# Patient Record
Sex: MALE | Race: WHITE | NOT HISPANIC OR LATINO | Employment: UNEMPLOYED | URBAN - METROPOLITAN AREA
[De-identification: names, ages, dates, MRNs, and addresses within clinical notes are randomized per-mention and may not be internally consistent; named-entity substitution may affect disease eponyms.]

---

## 2018-03-15 ENCOUNTER — HOSPITAL ENCOUNTER (EMERGENCY)
Facility: MEDICAL CENTER | Age: 33
End: 2018-03-15

## 2018-03-15 VITALS
RESPIRATION RATE: 16 BRPM | HEART RATE: 92 BPM | HEIGHT: 72 IN | WEIGHT: 166.01 LBS | DIASTOLIC BLOOD PRESSURE: 83 MMHG | SYSTOLIC BLOOD PRESSURE: 124 MMHG | OXYGEN SATURATION: 95 % | TEMPERATURE: 97.6 F | BODY MASS INDEX: 22.48 KG/M2

## 2018-03-15 PROCEDURE — 302449 STATCHG TRIAGE ONLY (STATISTIC)

## 2018-03-15 ASSESSMENT — PAIN SCALES - GENERAL: PAINLEVEL_OUTOF10: 6

## 2018-03-15 NOTE — ED TRIAGE NOTES
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

## 2018-04-16 ENCOUNTER — HOSPITAL ENCOUNTER (EMERGENCY)
Facility: MEDICAL CENTER | Age: 33
End: 2018-04-16

## 2021-04-17 ENCOUNTER — HOSPITAL ENCOUNTER (EMERGENCY)
Facility: MEDICAL CENTER | Age: 36
End: 2021-04-18
Attending: EMERGENCY MEDICINE
Payer: MEDICAID

## 2021-04-17 VITALS
HEIGHT: 72 IN | WEIGHT: 150 LBS | BODY MASS INDEX: 20.32 KG/M2 | SYSTOLIC BLOOD PRESSURE: 120 MMHG | OXYGEN SATURATION: 91 % | DIASTOLIC BLOOD PRESSURE: 79 MMHG | HEART RATE: 89 BPM | RESPIRATION RATE: 15 BRPM | TEMPERATURE: 98.4 F

## 2021-04-17 DIAGNOSIS — F29 PSYCHOSIS, UNSPECIFIED PSYCHOSIS TYPE (HCC): ICD-10-CM

## 2021-04-17 LAB
AMPHET UR QL SCN: NEGATIVE
BARBITURATES UR QL SCN: NEGATIVE
BENZODIAZ UR QL SCN: NEGATIVE
BZE UR QL SCN: NEGATIVE
CANNABINOIDS UR QL SCN: NEGATIVE
METHADONE UR QL SCN: NEGATIVE
OPIATES UR QL SCN: NEGATIVE
OXYCODONE UR QL SCN: NEGATIVE
PCP UR QL SCN: NEGATIVE
POC BREATHALIZER: 0 PERCENT (ref 0–0.01)
PROPOXYPH UR QL SCN: NEGATIVE

## 2021-04-17 PROCEDURE — 700102 HCHG RX REV CODE 250 W/ 637 OVERRIDE(OP): Performed by: EMERGENCY MEDICINE

## 2021-04-17 PROCEDURE — 700111 HCHG RX REV CODE 636 W/ 250 OVERRIDE (IP): Performed by: EMERGENCY MEDICINE

## 2021-04-17 PROCEDURE — A9270 NON-COVERED ITEM OR SERVICE: HCPCS | Performed by: EMERGENCY MEDICINE

## 2021-04-17 PROCEDURE — 96372 THER/PROPH/DIAG INJ SC/IM: CPT

## 2021-04-17 PROCEDURE — 302970 POC BREATHALIZER: Performed by: EMERGENCY MEDICINE

## 2021-04-17 PROCEDURE — 99285 EMERGENCY DEPT VISIT HI MDM: CPT

## 2021-04-17 PROCEDURE — 80307 DRUG TEST PRSMV CHEM ANLYZR: CPT

## 2021-04-17 RX ORDER — DIPHENHYDRAMINE HYDROCHLORIDE 50 MG/ML
50 INJECTION INTRAMUSCULAR; INTRAVENOUS ONCE
Status: COMPLETED | OUTPATIENT
Start: 2021-04-17 | End: 2021-04-17

## 2021-04-17 RX ORDER — LORAZEPAM 2 MG/1
2 TABLET ORAL ONCE
Status: COMPLETED | OUTPATIENT
Start: 2021-04-17 | End: 2021-04-17

## 2021-04-17 RX ORDER — HALOPERIDOL 5 MG/ML
5 INJECTION INTRAMUSCULAR ONCE
Status: DISCONTINUED | OUTPATIENT
Start: 2021-04-17 | End: 2021-04-17

## 2021-04-17 RX ORDER — HALOPERIDOL 5 MG/ML
5 INJECTION INTRAMUSCULAR ONCE
Status: COMPLETED | OUTPATIENT
Start: 2021-04-17 | End: 2021-04-17

## 2021-04-17 RX ADMIN — HALOPERIDOL LACTATE 5 MG: 5 INJECTION, SOLUTION INTRAMUSCULAR at 10:00

## 2021-04-17 RX ADMIN — LORAZEPAM 2 MG: 2 TABLET ORAL at 08:15

## 2021-04-17 RX ADMIN — DIPHENHYDRAMINE HYDROCHLORIDE 50 MG: 50 INJECTION, SOLUTION INTRAMUSCULAR; INTRAVENOUS at 10:00

## 2021-04-17 ASSESSMENT — LIFESTYLE VARIABLES: REASON UNABLE TO ASSESS: UTA

## 2021-04-17 NOTE — ED PROVIDER NOTES
"ED Provider Note    CHIEF COMPLAINT  Chief Complaint   Patient presents with    Psych Eval       HPI  Tony Cotton is a 35 y.o. male here for evaluation of psychosis.  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  He then states that he is \"SERENE Tobin\" and that he is the \"richest fucking man alive.\"  Patient endorses methamphetamine use and possibly other drugs, but he is unable to give an exact time when his last use was.  He has no chest pain or shortness of breath, he has no fever chills, he has no vomiting.    ROS;  Please see HPI  O/W negative     PAST MEDICAL HISTORY   No known medical bleeding disorders    SOCIAL HISTORY  Social History     Tobacco Use    Smoking status: Current Some Day Smoker   Substance and Sexual Activity    Alcohol use: Yes    Drug use: Yes    Sexual activity: Not on file       SURGICAL HISTORY  patient denies any surgical history    CURRENT MEDICATIONS  Home Medications       Reviewed by Catherine Segundo R.N. (Registered Nurse) on 04/17/21 at 0742  Med List Status: Unable to Obtain     Medication Last Dose Status        Patient Charly Taking any Medications                           ALLERGIES  No Known Allergies    REVIEW OF SYSTEMS  See HPI for further details. Review of systems as above, otherwise all other systems are negative.     PHYSICAL EXAM  VITAL SIGNS: BP (!) 170/100   Pulse (!) 107   Temp 36.9 °C (98.4 °F) (Temporal)   Resp 18   Ht 1.829 m (6')   Wt 68 kg (150 lb)   SpO2 96%   BMI 20.34 kg/m²     Constitutional: Well developed, well nourished.  Moderate acute distress.  HEENT: Normocephalic, atraumatic. MMM  Neck: Supple, Full range of motion   Chest/Pulmonary:  No respiratory distress.  Equal expansion   Musculoskeletal: No deformity, no edema, neurovascular intact.   Neuro: Tangential speech, inappropriate, uncooperative cranial nerves II-XII grossly intact.  Psych: Agitated mood and " affect    Results for orders placed or performed during the hospital encounter of 04/17/21   URINE DRUG SCREEN   Result Value Ref Range    Amphetamines Urine Negative Negative    Barbiturates Negative Negative    Benzodiazepines Negative Negative    Cocaine Metabolite Negative Negative    Methadone Negative Negative    Opiates Negative Negative    Oxycodone Negative Negative    Phencyclidine -Pcp Negative Negative    Propoxyphene Negative Negative    Cannabinoid Metab Negative Negative   POC BREATHALIZER   Result Value Ref Range    POC Breathalizer 0 0.00 - 0.01 Percent           PROCEDURES     MEDICAL RECORD  I have reviewed patient's medical record and pertinent results are listed.    COURSE & MEDICAL DECISION MAKING  I have reviewed any medical record information, laboratory studies and radiographic results as noted above.    8:05 AM  At this time a legal hold was filled out for the patient.  He is yelling statements that he is richest man on earth, and then he is masturbating in the room when people walk in.  Patient will be seen and evaluated by psych and likely transferred.      FINAL IMPRESSION  1. Psychosis, unspecified psychosis type (HCC)             Electronically signed by: Georges Andrew D.O., 4/17/2021 8:04 AM

## 2021-04-17 NOTE — ED NOTES
Called security to remove restraints.  Pt calm, verbalized understanding that he may not flash his genitals or masturbate at staff, may not be aggressive toward staff.  Agrees, verbalized understanding that if he does resume aggressive behaviors, restraints may be restarted.

## 2021-04-17 NOTE — ED TRIAGE NOTES
"Tony Cotton 35 y.o. male bib EMS for     Chief Complaint   Patient presents with   • Psych Eval     EMS called to [employer], patient's employer, because he reportedly was yelling at people in the store to leave without paying and then started just yelling.  Pt had pressured speech made up mostly of numbers.  Pt's name confirmed but difficult to obtain.  Pt denies to EMS drug use but endorsed hallucinations, both auditory and visual.  Pt tells RN, \"Yeah and it feels so good\" when asked about meth use.  Pt states repeatedly, \"I am the richest person alive, yo and it feels so damn good\".  Pt clapping randomly.  Pt denies pain or any medical complaint.  Denies SI/HI.  BP (!) 170/100   Pulse (!) 107   Temp 36.9 °C (98.4 °F) (Temporal)   Resp 18   Ht 1.829 m (6')   Wt 68 kg (150 lb)   SpO2 96%   BMI 20.34 kg/m²     "

## 2021-04-17 NOTE — ED NOTES
Knife taken with security; PARs took money, ID and Social Security to safe keeping. Patients belongings placed in locker #2 (2 bags )

## 2021-04-17 NOTE — ED NOTES
Sitter 1;1 in direct ;line of sight, pt remains in restraints and is still on L2k. Vitals to follow.

## 2021-04-17 NOTE — ED NOTES
ERP bedside.  Pt became suddenly agitated, pulled off all medical equipment and began masturbating.  Pt unable to be redirected.  Pt being placed on Legal 2000 by ERP and pt transporting to [facility].

## 2021-04-17 NOTE — ED NOTES
Sitter 1:1 at bedside, pt resting on side. Provided patient with a blanket. No new orders, urinal at bedside

## 2021-04-17 NOTE — ED NOTES
Unable to obtain med rec at this time. Pt is unable to participate in interview at this time. No pharmacy of emergency contacts listed.

## 2021-04-17 NOTE — ED NOTES
Patient attempted to elope, erp and [facility] security notified. Restraints ordered per ERP as patient on L2K.

## 2021-04-17 NOTE — ED NOTES
Pt to [facility] w/ security standing by.  Pt stopped masturbating as requested by security for transport through ED.  Bedside report to Rich ABREU.

## 2021-04-17 NOTE — ED NOTES
"Male sitter 1:1 at bedside; as reported to this RN that the patient can be extremely inappropriate. Flashing himself to people in the room; repeating \"man I feel so fucking good.\"     Patient wsa given 2mg PO ativan; pt took pill without water as he poured water down his face and repeated, \"I'm the richest man in the world, I work at all the [employer]; and I feel so amazing.\"    Bed low and locked, urinal at bedside for UA/UDS   "

## 2021-04-18 PROCEDURE — 90791 PSYCH DIAGNOSTIC EVALUATION: CPT

## 2021-04-18 NOTE — ED NOTES
"Report to Sree ABREU. All questions answered. Pt just awoke and states \"when can I go home?\" Pt educated on legal hold process. Alert team will be notified pt is awake. VS reassessed and stable.  "

## 2021-04-18 NOTE — ED NOTES
Assumed care of patient. Pt assessed, and sleeping in bed . Patient's concerns addressed.  Fall precautions in place.  Pt repositioned and comfortable.  Charge notified of need for sitter. VS reassessed. Will continue to monitor. This RN masked and in appropriate PPE during encounter.

## 2021-04-18 NOTE — ED NOTES
Patient resting in bed. No acute distress.    No complaints at this time.    Fall and safety precautions maintained.    Hourly rounding in progress.    1 to 1 sitter outside door,

## 2021-04-18 NOTE — ED NOTES
Patient discharged in stable condition per orders. Wristband removed per protocol. Patient verbalized understanding of all discharge instructions. All belongings accounted for.

## 2021-04-18 NOTE — ED PROVIDER NOTES
ED Provider Note    Addendum:    Patient placed on legal hold yesterday. Behavioral health services evaluated the patient tonight. Patient seems to have cleared from his psychosis. Patient now very polite and respectful. No SI HI. Appears to be able to care for self. Will discharge from the ER this point.    I have decertified the legal hold given his current clinical presentation.    Labs: UDS negative   alcohol negative.      Impression:    Transient psychosis likely secondary to ingestion of unknown substance.

## 2021-04-18 NOTE — ED NOTES
Pt rounded on, asleep in bed, respirations even and unlabored, repositioning self as needed, updated on POC, sitter in view.

## 2021-04-18 NOTE — ED NOTES
Pt rounded on asleep in bed, in bed, respirations even and unlabored, repositioning self as needed and pt is now lying prone, sitter in view.

## 2021-04-18 NOTE — ED NOTES
Pt rounded on, asleep in bed, respirations even and unlabored, repositioning self as needed, sitter Zoila in view, report given to Zoila.

## 2021-04-18 NOTE — CONSULTS
"[facility]   TRIAGE ASSESSMENT    Name: Tony Cotton  MRN: [id number]  : 1985  Age: 35 y.o.  Date of assessment: 2021  PCP: Pcp Pt States None  Persons in attendance: Patient    CHIEF COMPLAINT/PRESENTING ISSUE (as stated by patient): Pt is a 34 y/o male BIB EMS after they were called to [employer], the pt's employer, because he reportedly was yelling at people in the store to leave without paying and then started just yelling.  Pt had pressured speech made up mostly of numbers.  Pt's name confirmed but difficult to obtain.  Pt denies to EMS drug use but endorsed hallucinations, both auditory and visual.  Pt tells RN, \"Yeah and it feels so good\" when asked about meth use.  Pt states repeatedly, \"I am the richest person alive, yo and it feels so damn good\".  Pt clapping randomly. When assessed by the ERP, pt became agitated and pulled all of his medical equipment off and began masturbating. Pt was placed on a legal hold at this time. Pt was given 2mg PO ativan without water because he poured the water down his face and repeated, \"I'm the richest man in the world, I work at all the [employer], and I feel so amazing.\" Urinated on the floor. Attempted to elope and was placed in restraints. Restraints removed at 1255 on 2021. Pt then slept for the rest of the afternoon into the evening.     Pt assessed by this writer for behavioral health consult. Pt appears way more clear than he was earlier after getting some rest. When pt was asked what happened he states, \"I just lost it earlier. I haven't been sleeping good and I've been working a lot.\" Pt logical and making sense. Denies SI/HI/AVH. Pt asking to go home. Pt states he does not take any medication. Currently has a therapist through [facility]. Hx of inpatient psychiatric hospitalization 2 years ago. Denies ETOH use; GIOVANNY 0.00. States he has not used meth in two years; UDS negative. ERP re-evaluated pt and agrees pt is safe to discharge to self and " return home at this time. Pt states he lives alone in an apartment. Legal hold de-certified.   Chief Complaint   Patient presents with   • Psych Eval        CURRENT LIVING SITUATION/SOCIAL SUPPORT: Lives alone in an apartment. Works full time at [employer]. Reports brother as social support system.     BEHAVIORAL HEALTH TREATMENT HISTORY  Does patient/parent report a history of prior behavioral health treatment for patient?   Yes:    Dates Level of Care Facilty/Provider Diagnosis/Problem Medications   Current [facility] for therapy                                                                          SAFETY ASSESSMENT - SELF  Does patient acknowledge current or past symptoms of dangerousness to self? no  Does parent/significant other report patient has current or past symptoms of dangerousness to self? N\A  Does presenting problem suggest symptoms of dangerousness to self? No; denies SI.     SAFETY ASSESSMENT - OTHERS  Does patient acknowledge current or past symptoms of aggressive behavior or risk to others? no  Does parent/significant other report patient has current or past symptoms of aggressive behavior or risk to others?  N\A  Does presenting problem suggest symptoms of dangerousness to others? No; denies HI/aggressive hx.    Crisis Safety Plan completed and copy given to patient? N/A     ABUSE/NEGLECT SCREENING  Does patient report feeling “unsafe” in his/her home, or afraid of anyone?  no  Does patient report any history of physical, sexual, or emotional abuse?  no  Does parent or significant other report any of the above? N\A  Is there evidence of neglect by self?  no  Is there evidence of neglect by a caregiver? no  Does the patient/parent report any history of CPS/APS/police involvement related to suspected abuse/neglect or domestic violence? no  Based on the information provided during the current assessment, is a mandated report of suspected abuse/neglect being made?  No    SUBSTANCE USE  "SCREENING  Yes:  William all substances used in the past 30 days:      Last Use Amount   []   Alcohol     []   Marijuana     []   Heroin     []   Prescription Opioids  (used without prescription, for    recreation, or in excess of prescribed amount)     []   Other Prescription  (used without prescription, for    recreation, or in excess of prescribed amount)     []   Cocaine      [x]   Methamphetamine 2 years ago Did not specify   []   \"\" drugs (ectasy, MDMA)     []   Other substances        UDS results: negative  Breathalyzer results: 0.00      MENTAL STATUS   Participation: Active verbal participation  Grooming: Casual  Orientation: Fully Oriented and Drowsy/Somnolent  Behavior: Calm  Eye contact: Poor  Mood: Euthymic  Affect: Blunted  Thought process: Logical and Circumstantial  Thought content: Within normal limits  Speech: Soft  Perception: Within normal limits  Memory:  No gross evidence of memory deficits  Insight: Adequate  Judgment:  Adequate  Other:    Collateral information:   Source:  [] Significant other present in person:   [] Significant other by telephone  [] [facility]   [x] [facility] Nursing Staff  [x] [facility]  [x] Other: ERP    [] Unable to complete full assessment due to:  [] Acute intoxication  [] Patient declined to participate/engage  [] Patient verbally unresponsive  [] Significant cognitive deficits  [] Significant perceptual distortions or behavioral disorganization  [x] Other: N/A     CLINICAL IMPRESSIONS:  Primary:  Substance use  Secondary:  Psychosis (cleared up once assessed for behavioral health consult)       IDENTIFIED NEEDS/PLAN:  [Trigger DISPOSITION list for any items marked]    []  Imminent safety risk - self [] Imminent safety risk - others   []  Acute substance withdrawal []  Psychosis/Impaired reality testing   []  Mood/anxiety [x]  Substance use/Addictive behavior   [x]  Maladaptive behavior []  Parent/child conflict   []  Family/Couples conflict []  " Biomedical   []  Housing []  Financial   []   Legal  Occupational/Educational   []  Domestic violence []  Other:     Recommended Plan of Care:  Actively being addressed by [facility]. Medicaid FFS insurance plan. Denies SI/HI/AVH. Pt appears to have cleared up and not showing signs of psychosis. Findings discussed with ERP who agrees pt is safe to discharge to self and return home.     Does patient express agreement with the above plan? yes    Referral appointment(s) scheduled? N\A    Alert team only:   I have discussed findings and recommendations with Dr. Piedra who is in agreement with these recommendations.     Referral information sent to the following community providers : N/A      Cynthia Paulino R.N.  4/18/2021

## 2021-04-23 ENCOUNTER — HOSPITAL ENCOUNTER (EMERGENCY)
Facility: MEDICAL CENTER | Age: 36
End: 2021-04-23
Attending: EMERGENCY MEDICINE
Payer: MEDICAID

## 2021-04-23 VITALS
DIASTOLIC BLOOD PRESSURE: 96 MMHG | BODY MASS INDEX: 25.77 KG/M2 | HEIGHT: 70 IN | RESPIRATION RATE: 19 BRPM | HEART RATE: 88 BPM | OXYGEN SATURATION: 98 % | TEMPERATURE: 97.8 F | SYSTOLIC BLOOD PRESSURE: 146 MMHG | WEIGHT: 180 LBS

## 2021-04-23 DIAGNOSIS — F10.929 ALCOHOLIC INTOXICATION WITH COMPLICATION (HCC): ICD-10-CM

## 2021-04-23 LAB
ALBUMIN SERPL BCP-MCNC: 3.8 G/DL (ref 3.2–4.9)
ALBUMIN/GLOB SERPL: 1.3 G/DL
ALP SERPL-CCNC: 97 U/L (ref 30–99)
ALT SERPL-CCNC: 64 U/L (ref 2–50)
ANION GAP SERPL CALC-SCNC: 14 MMOL/L (ref 7–16)
APAP SERPL-MCNC: <5 UG/ML (ref 10–30)
AST SERPL-CCNC: 94 U/L (ref 12–45)
BASOPHILS # BLD AUTO: 0.5 % (ref 0–1.8)
BASOPHILS # BLD: 0.03 K/UL (ref 0–0.12)
BILIRUB SERPL-MCNC: 0.2 MG/DL (ref 0.1–1.5)
BUN SERPL-MCNC: 14 MG/DL (ref 8–22)
CALCIUM SERPL-MCNC: 7.8 MG/DL (ref 8.5–10.5)
CHLORIDE SERPL-SCNC: 103 MMOL/L (ref 96–112)
CO2 SERPL-SCNC: 19 MMOL/L (ref 20–33)
CREAT SERPL-MCNC: 0.62 MG/DL (ref 0.5–1.4)
EOSINOPHIL # BLD AUTO: 0.2 K/UL (ref 0–0.51)
EOSINOPHIL NFR BLD: 3.2 % (ref 0–6.9)
ERYTHROCYTE [DISTWIDTH] IN BLOOD BY AUTOMATED COUNT: 41.5 FL (ref 35.9–50)
ETHANOL BLD-MCNC: 282 MG/DL (ref 0–10)
GLOBULIN SER CALC-MCNC: 2.9 G/DL (ref 1.9–3.5)
GLUCOSE SERPL-MCNC: 121 MG/DL (ref 65–99)
HCT VFR BLD AUTO: 37.6 % (ref 42–52)
HGB BLD-MCNC: 12.4 G/DL (ref 14–18)
IMM GRANULOCYTES # BLD AUTO: 0.03 K/UL (ref 0–0.11)
IMM GRANULOCYTES NFR BLD AUTO: 0.5 % (ref 0–0.9)
LYMPHOCYTES # BLD AUTO: 1.04 K/UL (ref 1–4.8)
LYMPHOCYTES NFR BLD: 16.9 % (ref 22–41)
MCH RBC QN AUTO: 29.2 PG (ref 27–33)
MCHC RBC AUTO-ENTMCNC: 33 G/DL (ref 33.7–35.3)
MCV RBC AUTO: 88.5 FL (ref 81.4–97.8)
MONOCYTES # BLD AUTO: 0.42 K/UL (ref 0–0.85)
MONOCYTES NFR BLD AUTO: 6.8 % (ref 0–13.4)
NEUTROPHILS # BLD AUTO: 4.44 K/UL (ref 1.82–7.42)
NEUTROPHILS NFR BLD: 72.1 % (ref 44–72)
NRBC # BLD AUTO: 0 K/UL
NRBC BLD-RTO: 0 /100 WBC
PLATELET # BLD AUTO: 298 K/UL (ref 164–446)
PMV BLD AUTO: 9.5 FL (ref 9–12.9)
POTASSIUM SERPL-SCNC: 3.1 MMOL/L (ref 3.6–5.5)
PROT SERPL-MCNC: 6.7 G/DL (ref 6–8.2)
RBC # BLD AUTO: 4.25 M/UL (ref 4.7–6.1)
SALICYLATES SERPL-MCNC: <1 MG/DL (ref 15–25)
SODIUM SERPL-SCNC: 136 MMOL/L (ref 135–145)
WBC # BLD AUTO: 6.2 K/UL (ref 4.8–10.8)

## 2021-04-23 PROCEDURE — 85025 COMPLETE CBC W/AUTO DIFF WBC: CPT

## 2021-04-23 PROCEDURE — 80179 DRUG ASSAY SALICYLATE: CPT

## 2021-04-23 PROCEDURE — 99284 EMERGENCY DEPT VISIT MOD MDM: CPT

## 2021-04-23 PROCEDURE — 80053 COMPREHEN METABOLIC PANEL: CPT

## 2021-04-23 PROCEDURE — 82077 ASSAY SPEC XCP UR&BREATH IA: CPT

## 2021-04-23 PROCEDURE — 36415 COLL VENOUS BLD VENIPUNCTURE: CPT

## 2021-04-23 PROCEDURE — 80143 DRUG ASSAY ACETAMINOPHEN: CPT

## 2021-04-23 NOTE — ED TRIAGE NOTES
"Chief Complaint   Patient presents with   • Alcohol Intoxication     Pt's friend states he 'chugged' 1/5 of liquor approx 07:00 this AM, did not state why, pt arrives [facility] responds to noxious stimulus and states his name, pt vomited at triage, no signs of aspiration, given 4mg of zofran en route by EMS.      Pt BIB EMS for above complaint, VSS on RA, BG 91, zofran and 300mL fluids given en route.     /69   Pulse 83   Temp 36.6 °C (97.8 °F) (Temporal)   Ht 1.778 m (5' 10\")   Wt 81.6 kg (180 lb)   SpO2 92%   BMI 25.83 kg/m²      "

## 2021-04-23 NOTE — ED PROVIDER NOTES
ED Provider Note    CHIEF COMPLAINT  No chief complaint on file.      HPI  Tony Cotton is a 35 y.o. male with recent hx of psychiatric illness, evaluated for psychosis aproximately 1week ago, pt psychosis cleared, pt was lucid and sxs resolved so patient was discharged home. His UDS at the time was unremarkable.  Patient today apparently drank 1/5 of vodka in less than 5 minutes in front of his roommate.  There was no obvious coingestion.  Patient became acutely intoxicated and EMS was called when patient became increasingly more intoxicated.  Patient apparently drinks the alcohol 7 AM this morning.  Per EMS there were no other pill bottles.  Per EMS there was no communication with the roommate that patient wanted to harm himself.  History is obviously significantly limited as patient is severely intoxicated.      REVIEW OF SYSTEMS  ROS    See HPI for further details. All other systems are negative.     PAST MEDICAL HISTORY       SOCIAL HISTORY  Social History     Tobacco Use   • Smoking status: Current Some Day Smoker   Substance and Sexual Activity   • Alcohol use: Yes   • Drug use: Yes   • Sexual activity: Not on file       SURGICAL HISTORY  patient denies any surgical history    CURRENT MEDICATIONS  Home Medications    **Home medications have not yet been reviewed for this encounter**         ALLERGIES  No Known Allergies    PHYSICAL EXAM  There were no vitals filed for this visit.    Physical Exam   Constitutional: He appears well-developed and well-nourished.   Smells of alcohol   HENT:   Head: Normocephalic and atraumatic.   Eyes: Pupils are equal, round, and reactive to light. Conjunctivae are normal.   Cardiovascular: Normal rate and regular rhythm. Exam reveals no gallop and no friction rub.   No murmur heard.  Pulmonary/Chest: Effort normal and breath sounds normal. No respiratory distress. He has no wheezes.   Abdominal: Soft. Bowel sounds are normal. He exhibits no distension. There is no abdominal  tenderness. There is no rebound.   Musculoskeletal:      Cervical back: Normal range of motion and neck supple.   Neurological:   Gag reflex intact   Skin: Skin is warm and dry.   Psychiatric:   Severely intoxicated, opens eyes, and localizes to sternal rub moving all extremities     DIAGNOSTIC STUDIES / PROCEDURES      LABS  Results for orders placed or performed during the hospital encounter of 04/23/21   CBC WITH DIFFERENTIAL   Result Value Ref Range    WBC 6.2 4.8 - 10.8 K/uL    RBC 4.25 (L) 4.70 - 6.10 M/uL    Hemoglobin 12.4 (L) 14.0 - 18.0 g/dL    Hematocrit 37.6 (L) 42.0 - 52.0 %    MCV 88.5 81.4 - 97.8 fL    MCH 29.2 27.0 - 33.0 pg    MCHC 33.0 (L) 33.7 - 35.3 g/dL    RDW 41.5 35.9 - 50.0 fL    Platelet Count 298 164 - 446 K/uL    MPV 9.5 9.0 - 12.9 fL    Neutrophils-Polys 72.10 (H) 44.00 - 72.00 %    Lymphocytes 16.90 (L) 22.00 - 41.00 %    Monocytes 6.80 0.00 - 13.40 %    Eosinophils 3.20 0.00 - 6.90 %    Basophils 0.50 0.00 - 1.80 %    Immature Granulocytes 0.50 0.00 - 0.90 %    Nucleated RBC 0.00 /100 WBC    Neutrophils (Absolute) 4.44 1.82 - 7.42 K/uL    Lymphs (Absolute) 1.04 1.00 - 4.80 K/uL    Monos (Absolute) 0.42 0.00 - 0.85 K/uL    Eos (Absolute) 0.20 0.00 - 0.51 K/uL    Baso (Absolute) 0.03 0.00 - 0.12 K/uL    Immature Granulocytes (abs) 0.03 0.00 - 0.11 K/uL    NRBC (Absolute) 0.00 K/uL   CMP   Result Value Ref Range    Sodium 136 135 - 145 mmol/L    Potassium 3.1 (L) 3.6 - 5.5 mmol/L    Chloride 103 96 - 112 mmol/L    Co2 19 (L) 20 - 33 mmol/L    Anion Gap 14.0 7.0 - 16.0    Glucose 121 (H) 65 - 99 mg/dL    Bun 14 8 - 22 mg/dL    Creatinine 0.62 0.50 - 1.40 mg/dL    Calcium 7.8 (L) 8.5 - 10.5 mg/dL    AST(SGOT) 94 (H) 12 - 45 U/L    ALT(SGPT) 64 (H) 2 - 50 U/L    Alkaline Phosphatase 97 30 - 99 U/L    Total Bilirubin 0.2 0.1 - 1.5 mg/dL    Albumin 3.8 3.2 - 4.9 g/dL    Total Protein 6.7 6.0 - 8.2 g/dL    Globulin 2.9 1.9 - 3.5 g/dL    A-G Ratio 1.3 g/dL   DIAGNOSTIC ALCOHOL   Result Value Ref  Range    Diagnostic Alcohol 282.0 (H) 0.0 - 10.0 mg/dL   ACETAMINOPHEN   Result Value Ref Range    Acetaminophen -Tylenol <5 (L) 10 - 30 ug/mL   Salicylate   Result Value Ref Range    Salicylates, Quant. <1 (L) 15 - 25 mg/dL   ESTIMATED GFR   Result Value Ref Range    GFR If African American >60 >60 mL/min/1.73 m 2    GFR If Non African American >60 >60 mL/min/1.73 m 2         RADIOLOGY  No orders to display         COURSE & MEDICAL DECISION MAKING  Pertinent Labs & Imaging studies reviewed. (See chart for details)    Patient here severely intoxicated, given that he drank at approximately 7 AM I anticipate patient becoming more intoxicated while here in the emergency department.  Patient will remain on a pulse oximeter and have close observation from our nursing staff throughout his stay here.  As patient is unable to provide any history we will check basic labs to help support the supposition that this is simply secondary to alcohol, no evidence of any major head trauma.    Errors letter patient was easily arousable.  Ambulatory without assistance. denied any suicidal/homicidal ideation.  He admitted to drinking heavy alcohol.  Patient's basic labs are reassuring.  Patient offered assistance with alcohol abuse, he refused.  Return precautions discussed.     The patient will return for worsening symptoms and is stable at the time of discharge. The patient verbalizes understanding and will comply.    FINAL IMPRESSION    1. Alcoholic intoxication with complication (HCC)                Electronically signed by: Derrell Martin M.D., 4/23/2021 8:26 AM

## 2021-04-23 NOTE — ED NOTES
Pt placed in upright position, VSS on RA, [facility], suction ready at bedside, will continue to monitor.

## 2021-04-23 NOTE — ED NOTES
Pt ambulated to bathroom independently, GCS 15, VSS on MD MARIA DE JESUS notified pt is alert/oriented and wanting to leave

## 2021-04-23 NOTE — ED NOTES
"Pt pulled out his IV and disconnected himself from monitors in room. Pt standing at doorway to room stating \"I am ready to get the hell out of here, I don't need to be here anymore.\" Pt asked to wait for primary RN to come talk to him as she was getting him a shirt to wear but he said \"No, I don't need a shirt, I've got all my stuff and I'm gonna walk out of here.\" Pt ambulatory with steady gait with all his belongings. Pt would not wait for discharge paperwork and walked out.  "

## 2021-08-24 ENCOUNTER — HOSPITAL ENCOUNTER (EMERGENCY)
Facility: MEDICAL CENTER | Age: 36
End: 2021-08-24
Attending: EMERGENCY MEDICINE
Payer: COMMERCIAL

## 2021-08-24 VITALS
HEIGHT: 72 IN | RESPIRATION RATE: 18 BRPM | DIASTOLIC BLOOD PRESSURE: 90 MMHG | WEIGHT: 180 LBS | BODY MASS INDEX: 24.38 KG/M2 | SYSTOLIC BLOOD PRESSURE: 134 MMHG | TEMPERATURE: 98.4 F | OXYGEN SATURATION: 98 % | HEART RATE: 98 BPM

## 2021-08-24 DIAGNOSIS — Z72.6 GAMBLING PROBLEM: ICD-10-CM

## 2021-08-24 DIAGNOSIS — F31.61 BIPOLAR DISORDER, CURRENT EPISODE MIXED, MILD (HCC): ICD-10-CM

## 2021-08-24 DIAGNOSIS — F15.10 METHAMPHETAMINE ABUSE (HCC): ICD-10-CM

## 2021-08-24 LAB — POC BREATHALIZER: 0 PERCENT (ref 0–0.01)

## 2021-08-24 PROCEDURE — 90791 PSYCH DIAGNOSTIC EVALUATION: CPT

## 2021-08-24 PROCEDURE — 302970 POC BREATHALIZER: Performed by: EMERGENCY MEDICINE

## 2021-08-24 PROCEDURE — 99285 EMERGENCY DEPT VISIT HI MDM: CPT

## 2021-08-24 ASSESSMENT — LIFESTYLE VARIABLES
DO YOU DRINK ALCOHOL: YES
TOTAL SCORE: 0
TOTAL SCORE: 0
HAVE YOU EVER FELT YOU SHOULD CUT DOWN ON YOUR DRINKING: NO
ON A TYPICAL DAY WHEN YOU DRINK ALCOHOL HOW MANY DRINKS DO YOU HAVE: 2
EVER FELT BAD OR GUILTY ABOUT YOUR DRINKING: NO
HAVE PEOPLE ANNOYED YOU BY CRITICIZING YOUR DRINKING: NO
CONSUMPTION TOTAL: POSITIVE
HOW MANY TIMES IN THE PAST YEAR HAVE YOU HAD 5 OR MORE DRINKS IN A DAY: 10
TOTAL SCORE: 0
DOES PATIENT WANT TO STOP DRINKING: NO
EVER HAD A DRINK FIRST THING IN THE MORNING TO STEADY YOUR NERVES TO GET RID OF A HANGOVER: NO
AVERAGE NUMBER OF DAYS PER WEEK YOU HAVE A DRINK CONTAINING ALCOHOL: 2

## 2021-08-24 ASSESSMENT — FIBROSIS 4 INDEX: FIB4 SCORE: 1.34

## 2021-08-25 NOTE — ED TRIAGE NOTES
Chief Complaint   Patient presents with   • Legal 2000     Patient romeo addison from [employer] on a legal hold. Patient states that he was seeking help with gambling. Patient was asked to rate his suicide level on a 0-10 scale if he was to keep losing money. Patient reported that he is not suicidal, but the score would be a 7.

## 2021-08-25 NOTE — ED PROVIDER NOTES
ED Provider Note     Scribed for Lyndsay Persaud D.O. by Scott Ruiz. 8/24/2021, 7:55 PM.     Primary care provider: Pcp Pt States None  Means of arrival: walk in         History obtained from: patient  History limited by: none    CHIEF COMPLAINT  Chief Complaint   Patient presents with   • Legal 2000     HPI  Tony Cotton is a 36 y.o. male who presents to the emergency Department from [facility] on a legal hold placed today. He went to [facility] today for bipolar medication and help with his gambling problem. He has been losing money due to gambling and was asked to rate his suicide level on a 0-10 scale if he were to keep losing money; he states he responded with a 6, prompting the legal hold. However, he vehemently denies suicidal ideation at bedside and states he was only answering the hypothetical question. He has not had any bipolar medication in the past 4 months and was previously on Vraylar, which did not help his bipolar. He also states he is in drug court and admits to drinking alcohol and using meth again. He states he would like to be put back on his Vraylar.    REVIEW OF SYSTEMS  Pertinent positives include drug abuse. Pertinent negatives include no suicidal ideation.   See HPI for further details.    PAST MEDICAL HISTORY  History reviewed. No pertinent past medical history.    FAMILY HISTORY  History reviewed. No pertinent family history.    SOCIAL HISTORY  Social History     Tobacco Use   • Smoking status: Current Some Day Smoker   • Smokeless tobacco: Never Used   Vaping Use   • Vaping Use: Every day   • Substances: Nicotine   • Devices: Disposable   Substance Use Topics   • Alcohol use: Yes   • Drug use: Not Currently      Social History     Substance and Sexual Activity   Drug Use Not Currently       SURGICAL HISTORY  History reviewed. No pertinent surgical history.    CURRENT MEDICATIONS  No current outpatient medications    ALLERGIES  No Known Allergies    PHYSICAL EXAM  VITAL SIGNS: BP  141/96   Pulse 95   Temp 36.3 °C (97.4 °F) (Oral)   Resp 16   Ht 1.829 m (6')   Wt 81.6 kg (180 lb)   SpO2 99%   BMI 24.41 kg/m²   Constitutional: Patient is well developed, well nourished. Non-toxic appearing. No acute distress.   HENT: Normocephalic. Healing brusies and abrasions to forehead and left facial area. Broken left front tooth  Thorax & Lungs: No respiratory distress, heart is regular in rhythm without murmur  Skin: Warm, Dry  Musculoskeletal: Normal range of motion in all major joints.  No neck or back pain.  Neurologic: Alert & oriented x 3, Normal motor function  Psychiatric: Affect normal, Judgment normal, Mood normal. Denies SI    DIAGNOSTICS/PROCEDURES    LABS  Results for orders placed or performed during the hospital encounter of 08/24/21   POC BREATHALIZER   Result Value Ref Range    POC Breathalizer 0.00 0.00 - 0.01 Percent     Labs reviewed by me    COURSE & MEDICAL DECISION MAKING  Pertinent Labs & Imaging studies reviewed. (See chart for details)    7:55 PM - Patient seen and evaluated at bedside. Ordered for urine drug screen, POC breathalizer to evaluate. Patient will be seen by Behavioral Health.    8:28 PM Spoke with Behavioral Health about the patient's condition. They informed me the patient is safe for discharge and has been given resources to get a new bipolar medication prescription.    The patient will return for new or worsening symptoms and is stable at the time of discharge.  Patient has been released from his legal hold.  He is to return if any problems or worsening in his condition.    DISPOSITION:  Patient will be discharged home in stable condition.    FOLLOW UP:  [facility] [address] [place] [address] [phone]  Call         OUTPATIENT MEDICATIONS:  New Prescriptions    No medications on file         FINAL IMPRESSION  1. Methamphetamine abuse (HCC)    2. Bipolar disorder, current episode mixed, mild (HCC)    3.  Gambling problem         I, Scott Ruiz ([place]), am scribing for, and in the presence of, Lyndsay Persaud D.O..    Electronically signed by: Scott Ruiz ([place]), 8/24/2021    I, Lyndsay Persaud D.O. personally performed the services described in this documentation, as scribed by Scott Ruiz in my presence, and it is both accurate and complete. E    The note accurately reflects work and decisions made by me.  Lyndsay Persaud D.O.  8/24/2021  11:19 PM

## 2021-08-25 NOTE — DISCHARGE INSTRUCTIONS
Follow-up with your resources that behavioral health has provided you to get on your medications again and hopefully stop using drugs and gambling.  Good luck

## 2021-08-25 NOTE — CONSULTS
"[facility]   TRIAGE ASSESSMENT    Name: Tony Cotton  MRN: [id number]  : 1985  Age: 36 y.o.  Date of assessment: 2021  PCP: Pcp Pt States None  Persons in attendance: Patient  Patient Location: [facility]    CHIEF COMPLAINT/PRESENTING ISSUE (as stated by Patient, ER RN, PARISA):   Chief Complaint   Patient presents with   • Legal       Patient is a 37 y/o male BIB EMS from [place] after placed on a legal hold for SI without plan or intent alongside AVH after methamphetamine relapse in the past 24 hours due to unavailable bed for admission. Patient admits to vague situational SI due to recent relapse and consequences; patient reports he is currently in drug court and residing at [facility] for the past 60 days. Patient released form detention in  then started on Vraylar but reports is ineffective; diagnosed with Bipolar II last year while in detention for 8 months. Patient admits to gambling addiction triggering depressive thoughts and increasing impulsivity. Patient denies SI, denies history of suicidal attempts or self-harm behaviors. Patient denies AVH but admits will have them when smoking meth. Patient reports plan to be admitted to [facility] x 3 days in order to cover his relapse and able to urinate clean for drug court. Patient is remorseful, wanting to \"come clean\" and discuss options with CM at [facility] and follow up with [facility] to change medications. Patient is no longer a danger to self and is able to safety plan home with brother tonight with f/u with [facility], [facility], and  tomorrow. Provided information for [facility] IOP and [facility] walk in clinic as well.     CURRENT LIVING SITUATION/SOCIAL SUPPORT/FINANCIAL RESOURCES: Patient currently in 60 day program at [facility] through drug court. Patient reports left and relapse with meth and alcohol, gambling at a local casino in the past 24 hours. Patient repots he is presently employed in " [employer].     BEHAVIORAL HEALTH/SUBSTANCE USE TREATMENT HISTORY  Does patient/parent report a history of prior behavioral health/substance use treatment for patient?   Yes:    Dates Level of Care Facilty/Provider Diagnosis/Problem Medications   current OP [facility] Bipolar D/O  Substance Use D/O           current Rehab x 60 days [facility]                       SAFETY ASSESSMENT - SELF  Does patient acknowledge current or past symptoms of dangerousness to self or is previous history noted? yes  Does parent/significant other report patient has current or past symptoms of dangerousness to self? N\A  Does presenting problem suggest symptoms of dangerousness to self? No; denies SI; denies hx of SAs or history of SH behaviors.     SAFETY ASSESSMENT - OTHERS  Does patient acknowledge current or past symptoms of aggressive behavior or risk to others or is previous history noted? no  Does parent/significant other report patient has current or past symptoms of aggressive behavior or risk to others?  N\A  Does presenting problem suggest symptoms of dangerousness to others? No; denies HI; denies hx of physical aggression.     LEGAL HISTORY  Does patient acknowledge history of arrest/senior care/skilled nursing or is previous history noted? Yes; reports     Crisis Safety Plan completed and copy given to patient? Yes; verbalized safety plan to return to rehab and check in with .     ABUSE/NEGLECT SCREENING  Does patient report feeling “unsafe” in his/her home, or afraid of anyone?  no  Does patient report any history of physical, sexual, or emotional abuse?  no  Does parent or significant other report any of the above? N\A  Is there evidence of neglect by self?  no  Is there evidence of neglect by a caregiver? no  Does the patient/parent report any history of CPS/APS/police involvement related to suspected abuse/neglect or domestic violence? no  Based on the information provided during the current  "assessment, is a mandated report of suspected abuse/neglect being made?  No    SUBSTANCE USE SCREENING  Yes:  William all substances used in the past 30 days:      Last Use Amount   [x]   Alcohol 8/24/2021 1/2 pint of liquor   []   Marijuana     []   Heroin     []   Prescription Opioids  (used without prescription, for    recreation, or in excess of prescribed amount)     []   Other Prescription  (used without prescription, for    recreation, or in excess of prescribed amount)     []   Cocaine      [x]   Methamphetamine 8/24/2021 5 hits   []   \"\" drugs (ectasy, MDMA)     []   Other substances        UDS results: not collected  Breathalyzer results: 0.00    What consequences does the patient associate with any of the above substance use and or addictive behaviors? Legal: currently in drug court, Health problems: substance addiction    Risk factors for detox (check all that apply):  []  Seizures   []  Diaphoretic (sweating)   []  Tremors   []  Hallucinations   []  Increased blood pressure   []  Decreased blood pressure   []  Other   [x]  None      [x] Patient education on risk factors for detoxification and instructed to return to ER as needed.      MENTAL STATUS   Participation: Active verbal participation, Engaged and Open to feedback  Grooming: Casual  Orientation: Alert and Fully Oriented  Behavior: Calm  Eye contact: Good  Mood: Anxious  Affect: Flexible and Full range  Thought process: Logical and Goal-directed  Thought content: Within normal limits  Speech: Rate within normal limits and Volume within normal limits  Perception: Within normal limits  Memory:  No gross evidence of memory deficits  Insight: Adequate  Judgment:  Limited  Other:    Collateral information:   Source:  [] Significant other present in person:   [] Significant other by telephone  [] [facility]   [x] [facility]  [x] [facility]  [x] Other: ERP    [] Unable to complete full assessment due to:  [] Acute " intoxication  [] Patient declined to participate/engage  [] Patient verbally unresponsive  [] Significant cognitive deficits  [] Significant perceptual distortions or behavioral disorganization  [x] Other: N/A     CLINICAL IMPRESSIONS:  Primary:  Depression, Anxiety  Secondary:  Bipolar hx, Substance use       IDENTIFIED NEEDS/PLAN:  [Trigger DISPOSITION list for any items marked]    []  Imminent safety risk - self [] Imminent safety risk - others   []  Acute substance withdrawal []  Psychosis/Impaired reality testing   [x]  Mood/anxiety [x]  Substance use/Addictive behavior   []  Maladaptive behaviro []  Parent/child conflict   []  Family/Couples conflict []  Biomedical   [x]  Housing []  Financial   [x]   Legal  Occupational/Educational   []  Domestic violence []  Other:     Recommended Plan of Care:  Defer and Refer to [facility], [facility] and [facility], [facility], Officer Robin BLANTON  *Telesitter may not be utilized for moderate or high risk patients    Has the Recommended Plan of Care/Level of Observation been reviewed with the patient's assigned nurse? yes    Does patient/parent or guardian express agreement with the above plan? yes    Referral appointment(s) scheduled? N\A    Alert team only: Legal hold discontinued by ERP. Patient denies SI and able to safety plan to brother's home to [facility] and [facility] tomorrow morning. Provided patient with additional resource list for [facility] IOP and [facility] walk in clinic.    I have discussed findings and recommendations with Dr. Persaud who is in agreement with these recommendations.     Laura Wilson RGeoffN.  8/24/2021

## 2021-09-20 ENCOUNTER — OFFICE VISIT (OUTPATIENT)
Dept: URGENT CARE | Facility: CLINIC | Age: 36
End: 2021-09-20
Payer: COMMERCIAL

## 2021-09-20 ENCOUNTER — HOSPITAL ENCOUNTER (OUTPATIENT)
Facility: MEDICAL CENTER | Age: 36
End: 2021-09-20
Attending: PHYSICIAN ASSISTANT
Payer: COMMERCIAL

## 2021-09-20 VITALS
HEIGHT: 71 IN | RESPIRATION RATE: 16 BRPM | BODY MASS INDEX: 24.44 KG/M2 | HEART RATE: 125 BPM | OXYGEN SATURATION: 98 % | WEIGHT: 174.6 LBS | TEMPERATURE: 97.9 F | DIASTOLIC BLOOD PRESSURE: 74 MMHG | SYSTOLIC BLOOD PRESSURE: 116 MMHG

## 2021-09-20 DIAGNOSIS — Z11.52 ENCOUNTER FOR SCREENING FOR COVID-19: ICD-10-CM

## 2021-09-20 PROCEDURE — U0005 INFEC AGEN DETEC AMPLI PROBE: HCPCS

## 2021-09-20 PROCEDURE — 99213 OFFICE O/P EST LOW 20 MIN: CPT | Mod: CS | Performed by: PHYSICIAN ASSISTANT

## 2021-09-20 PROCEDURE — U0003 INFECTIOUS AGENT DETECTION BY NUCLEIC ACID (DNA OR RNA); SEVERE ACUTE RESPIRATORY SYNDROME CORONAVIRUS 2 (SARS-COV-2) (CORONAVIRUS DISEASE [COVID-19]), AMPLIFIED PROBE TECHNIQUE, MAKING USE OF HIGH THROUGHPUT TECHNOLOGIES AS DESCRIBED BY CMS-2020-01-R: HCPCS

## 2021-09-20 RX ORDER — DIVALPROEX SODIUM 500 MG/1
TABLET, EXTENDED RELEASE ORAL
COMMUNITY
Start: 2021-08-27

## 2021-09-20 RX ORDER — RISPERIDONE 1 MG/1
TABLET ORAL
COMMUNITY
Start: 2021-08-27

## 2021-09-20 RX ORDER — BUPROPION HYDROCHLORIDE 100 MG/1
TABLET, EXTENDED RELEASE ORAL
COMMUNITY
Start: 2021-09-15

## 2021-09-20 RX ORDER — HYDROXYZINE PAMOATE 25 MG/1
CAPSULE ORAL
COMMUNITY
Start: 2021-08-27

## 2021-09-20 ASSESSMENT — ENCOUNTER SYMPTOMS
EYE PAIN: 0
DIARRHEA: 0
ABDOMINAL PAIN: 0
SORE THROAT: 1
CONSTIPATION: 0
HEADACHES: 0
COUGH: 1
MYALGIAS: 0
FEVER: 0
VOMITING: 0
NAUSEA: 0
CHILLS: 0
SHORTNESS OF BREATH: 0

## 2021-09-20 ASSESSMENT — FIBROSIS 4 INDEX: FIB4 SCORE: 1.34

## 2021-09-21 DIAGNOSIS — Z11.52 ENCOUNTER FOR SCREENING FOR COVID-19: ICD-10-CM

## 2021-09-21 LAB
AMBIGUOUS DTTM AMBI4: NORMAL
COVID ORDER STATUS COVID19: NORMAL
SARS-COV-2 RNA RESP QL NAA+PROBE: DETECTED
SPECIMEN SOURCE: ABNORMAL

## 2021-09-21 NOTE — PROGRESS NOTES
"Subjective:   Tony Cotton is a 36 y.o. male who presents for Cough (runny nose, shortness of breath x 2 days covid test)      36 years old male presents after covid positive exposure with 2 days of runny nose, cough, dysnpea while coughing.       Review of Systems   Constitutional: Positive for malaise/fatigue. Negative for chills and fever.   HENT: Positive for congestion and sore throat. Negative for ear pain.    Eyes: Negative for pain.   Respiratory: Positive for cough. Negative for shortness of breath.    Cardiovascular: Negative for chest pain.   Gastrointestinal: Negative for abdominal pain, constipation, diarrhea, nausea and vomiting.   Genitourinary: Negative for dysuria.   Musculoskeletal: Negative for myalgias.   Skin: Negative for rash.   Neurological: Negative for headaches.       Medications, Allergies, and current problem list reviewed today in Epic.     Objective:     /74   Pulse (!) 125   Temp 36.6 °C (97.9 °F) (Temporal)   Resp 16   Ht 1.8 m (5' 10.87\")   Wt 79.2 kg (174 lb 9.6 oz)   SpO2 98%     Physical Exam  Vitals reviewed.   Constitutional:       Appearance: Normal appearance.   HENT:      Head: Normocephalic and atraumatic.      Right Ear: External ear normal.      Left Ear: External ear normal.      Nose: Nose normal.      Mouth/Throat:      Mouth: Mucous membranes are moist.   Eyes:      Conjunctiva/sclera: Conjunctivae normal.   Cardiovascular:      Rate and Rhythm: Regular rhythm. Tachycardia present.   Pulmonary:      Effort: Pulmonary effort is normal.      Breath sounds: Normal breath sounds.   Skin:     General: Skin is warm and dry.      Capillary Refill: Capillary refill takes less than 2 seconds.   Neurological:      Mental Status: He is alert and oriented to person, place, and time.         Assessment/Plan:     Diagnosis and associated orders:     1. Encounter for screening for COVID-19  COVID/SARS CoV-2 PCR      Comments/MDM:     Patient's vital signs are " "reassuring and they appear hemodynamically stable and do not require higher level care at this time  I discussed self isolation and provided printed instructions (if applicable)  I discussed ER precautions and provided printed instructions (if applicable)  I educated the patient on possibility of a false-negative test and indications for repeat testing  I instructed the patient to try to follow up with their PCP (if applicable) for follow up care  I discussed the possibly of \"breakthrough infections\" in fully vaccinated people  The patient will receive results via MyChart (\"detected\" is a positive result, \"not detected\" indicates a negative result)  If requested, I provided the patient with a work note to provide to their employer or school regarding returning to work and discontinuation of self isolation.  All questions were answered and patient demonstrated verbal understanding of above.  I followed all reasonable PPE precautions during this encounter including but not limited to use of an N95 mask, gloves, and gown if indicated.             Differential diagnosis, natural history, supportive care, and indications for immediate follow-up discussed.    Advised the patient to follow-up with the primary care physician for recheck, reevaluation, and consideration of further management.    Please note that this dictation was created using voice recognition software. I have made a reasonable attempt to correct obvious errors, but I expect that there are errors of grammar and possibly content that I did not discover before finalizing the note.    This note was electronically signed by Arcadio Mitchell PA-C  "

## 2022-01-15 ENCOUNTER — HOSPITAL ENCOUNTER (EMERGENCY)
Facility: MEDICAL CENTER | Age: 37
End: 2022-01-15
Payer: COMMERCIAL

## 2022-01-15 VITALS
TEMPERATURE: 98.3 F | SYSTOLIC BLOOD PRESSURE: 150 MMHG | RESPIRATION RATE: 18 BRPM | DIASTOLIC BLOOD PRESSURE: 88 MMHG | HEART RATE: 113 BPM | OXYGEN SATURATION: 99 % | HEIGHT: 72 IN | BODY MASS INDEX: 27.77 KG/M2 | WEIGHT: 205 LBS

## 2022-01-15 PROCEDURE — 302449 STATCHG TRIAGE ONLY (STATISTIC)

## 2022-01-15 ASSESSMENT — FIBROSIS 4 INDEX: FIB4 SCORE: 1.34

## 2022-01-15 NOTE — ED NOTES
Pt refusing to be seen by physician, leaving AMA. Risks of leaving AMA/refusing to be seen by a physician explained to the pt. Pt demonstrated understanding, verbally. AMA formed signed, IV removed from RAC. Pt VSS. A+OX4, walked out without assistance after signing form.

## 2022-01-15 NOTE — ED TRIAGE NOTES
Pt bib ems for seizure after huffing duster x8 cans he bought from the grocery store. Pt denies hx of seizures, takes depakote. Pt present with a hematoma to the right side of the head, bleeding from ear. Pt lives at a rehab facility x3 weeks for meth use. PMHx: I IV drug use. Denies any pain. A+Ox4, VSS.